# Patient Record
Sex: FEMALE | Race: BLACK OR AFRICAN AMERICAN
[De-identification: names, ages, dates, MRNs, and addresses within clinical notes are randomized per-mention and may not be internally consistent; named-entity substitution may affect disease eponyms.]

---

## 2017-03-31 ENCOUNTER — HOSPITAL ENCOUNTER (EMERGENCY)
Dept: HOSPITAL 53 - M ED | Age: 27
Discharge: HOME | End: 2017-03-31
Payer: OTHER GOVERNMENT

## 2017-03-31 VITALS
SYSTOLIC BLOOD PRESSURE: 134 MMHG | HEIGHT: 64 IN | BODY MASS INDEX: 25.78 KG/M2 | DIASTOLIC BLOOD PRESSURE: 65 MMHG | WEIGHT: 151 LBS

## 2017-03-31 DIAGNOSIS — J02.9: Primary | ICD-10-CM

## 2017-06-08 ENCOUNTER — HOSPITAL ENCOUNTER (EMERGENCY)
Dept: HOSPITAL 53 - M ED | Age: 27
Discharge: HOME | End: 2017-06-08
Payer: OTHER GOVERNMENT

## 2017-06-08 VITALS — DIASTOLIC BLOOD PRESSURE: 70 MMHG | SYSTOLIC BLOOD PRESSURE: 156 MMHG

## 2017-06-08 VITALS — WEIGHT: 143 LBS | BODY MASS INDEX: 24.41 KG/M2 | HEIGHT: 64 IN

## 2017-06-08 DIAGNOSIS — Z3A.01: ICD-10-CM

## 2017-06-08 DIAGNOSIS — O20.8: Primary | ICD-10-CM

## 2017-06-08 DIAGNOSIS — Z90.89: ICD-10-CM

## 2017-06-08 LAB
BASOPHILS # BLD AUTO: 0 K/MM3 (ref 0–0.2)
BASOPHILS NFR BLD AUTO: 0.3 % (ref 0–1)
EOSINOPHIL # BLD AUTO: 0.1 K/MM3 (ref 0–0.5)
EOSINOPHIL NFR BLD AUTO: 1.2 % (ref 0–3)
ERYTHROCYTE [DISTWIDTH] IN BLOOD BY AUTOMATED COUNT: 13.6 % (ref 11.5–14.5)
LARGE UNSTAINED CELL #: 0.1 K/MM3 (ref 0–0.4)
LARGE UNSTAINED CELL %: 3 % (ref 0–4)
LYMPHOCYTES # BLD AUTO: 2 K/MM3 (ref 1.5–6.5)
LYMPHOCYTES NFR BLD AUTO: 40.8 % (ref 24–44)
MCH RBC QN AUTO: 25.7 PG (ref 27–33)
MCHC RBC AUTO-ENTMCNC: 31.3 G/DL (ref 32–36.5)
MCV RBC AUTO: 82.3 FL (ref 80–96)
MONOCYTES # BLD AUTO: 0.3 K/MM3 (ref 0–0.8)
MONOCYTES NFR BLD AUTO: 5.9 % (ref 0–5)
NEUTROPHILS # BLD AUTO: 2.3 K/MM3 (ref 1.8–7.7)
NEUTROPHILS NFR BLD AUTO: 48.8 % (ref 36–66)
PLATELET # BLD AUTO: 210 K/MM3 (ref 150–450)
WBC # BLD AUTO: 4.7 K/MM3 (ref 4–10)

## 2017-07-24 ENCOUNTER — HOSPITAL ENCOUNTER (EMERGENCY)
Dept: HOSPITAL 53 - M ED | Age: 27
Discharge: HOME | End: 2017-07-24
Payer: OTHER GOVERNMENT

## 2017-07-24 VITALS — SYSTOLIC BLOOD PRESSURE: 123 MMHG | DIASTOLIC BLOOD PRESSURE: 54 MMHG

## 2017-07-24 VITALS — WEIGHT: 152.12 LBS | HEIGHT: 64 IN | BODY MASS INDEX: 25.97 KG/M2

## 2017-07-24 DIAGNOSIS — R11.2: ICD-10-CM

## 2017-07-24 DIAGNOSIS — Z3A.11: ICD-10-CM

## 2017-07-24 DIAGNOSIS — R19.7: ICD-10-CM

## 2017-07-24 DIAGNOSIS — O99.89: Primary | ICD-10-CM

## 2017-07-24 DIAGNOSIS — Z79.899: ICD-10-CM

## 2017-07-24 DIAGNOSIS — R10.84: ICD-10-CM

## 2017-07-24 DIAGNOSIS — E86.0: ICD-10-CM

## 2017-07-24 LAB
ALBUMIN SERPL BCG-MCNC: 3.2 GM/DL (ref 3.2–5.2)
ALBUMIN/GLOB SERPL: 0.76 {RATIO} (ref 1–1.93)
ALP SERPL-CCNC: 136 U/L (ref 45–117)
ALT SERPL W P-5'-P-CCNC: 24 U/L (ref 12–78)
AMYLASE SERPL-CCNC: 65 U/L (ref 25–115)
ANION GAP SERPL CALC-SCNC: 9 MEQ/L (ref 8–16)
AST SERPL-CCNC: 11 U/L (ref 15–37)
B-HCG SERPL-ACNC: (no result) MIU/ML
BASOPHILS # BLD AUTO: 0 K/MM3 (ref 0–0.2)
BASOPHILS NFR BLD AUTO: 0.1 % (ref 0–1)
BILIRUB CONJ SERPL-MCNC: < 0.1 MG/DL (ref 0–0.2)
BILIRUB SERPL-MCNC: 0.3 MG/DL (ref 0.2–1)
BUN SERPL-MCNC: 7 MG/DL (ref 7–18)
CALCIUM SERPL-MCNC: 9.4 MG/DL (ref 8.5–10.1)
CHLORIDE SERPL-SCNC: 101 MEQ/L (ref 98–107)
CO2 SERPL-SCNC: 24 MEQ/L (ref 21–32)
CREAT SERPL-MCNC: 0.28 MG/DL (ref 0.55–1.02)
EOSINOPHIL # BLD AUTO: 0 K/MM3 (ref 0–0.5)
EOSINOPHIL NFR BLD AUTO: 0.3 % (ref 0–3)
ERYTHROCYTE [DISTWIDTH] IN BLOOD BY AUTOMATED COUNT: 13.6 % (ref 11.5–14.5)
GFR SERPL CREATININE-BSD FRML MDRD: > 60 ML/MIN/{1.73_M2} (ref 60–?)
GLUCOSE SERPL-MCNC: 95 MG/DL (ref 70–105)
LARGE UNSTAINED CELL #: 0.1 K/MM3 (ref 0–0.4)
LARGE UNSTAINED CELL %: 1.9 % (ref 0–4)
LYMPHOCYTES # BLD AUTO: 1.5 K/MM3 (ref 1.5–6.5)
LYMPHOCYTES NFR BLD AUTO: 24.3 % (ref 24–44)
MCH RBC QN AUTO: 26.1 PG (ref 27–33)
MCHC RBC AUTO-ENTMCNC: 32.1 G/DL (ref 32–36.5)
MCV RBC AUTO: 81.3 FL (ref 80–96)
MONOCYTES # BLD AUTO: 0.3 K/MM3 (ref 0–0.8)
MONOCYTES NFR BLD AUTO: 5.3 % (ref 0–5)
NEUTROPHILS # BLD AUTO: 3.9 K/MM3 (ref 1.8–7.7)
NEUTROPHILS NFR BLD AUTO: 68 % (ref 36–66)
PLATELET # BLD AUTO: 171 K/MM3 (ref 150–450)
POTASSIUM SERPL-SCNC: 3.5 MEQ/L (ref 3.5–5.1)
PROT SERPL-MCNC: 7.4 GM/DL (ref 6.4–8.2)
SODIUM SERPL-SCNC: 134 MEQ/L (ref 136–145)
WBC # BLD AUTO: 5.8 K/MM3 (ref 4–10)

## 2017-07-24 PROCEDURE — 80076 HEPATIC FUNCTION PANEL: CPT

## 2017-07-24 PROCEDURE — 84702 CHORIONIC GONADOTROPIN TEST: CPT

## 2017-07-24 PROCEDURE — 96375 TX/PRO/DX INJ NEW DRUG ADDON: CPT

## 2017-07-24 PROCEDURE — 83690 ASSAY OF LIPASE: CPT

## 2017-07-24 PROCEDURE — 96361 HYDRATE IV INFUSION ADD-ON: CPT

## 2017-07-24 PROCEDURE — 96374 THER/PROPH/DIAG INJ IV PUSH: CPT

## 2017-07-24 PROCEDURE — 81001 URINALYSIS AUTO W/SCOPE: CPT

## 2017-07-24 PROCEDURE — 99283 EMERGENCY DEPT VISIT LOW MDM: CPT

## 2017-07-24 PROCEDURE — 82150 ASSAY OF AMYLASE: CPT

## 2017-07-24 PROCEDURE — 87086 URINE CULTURE/COLONY COUNT: CPT

## 2017-07-24 PROCEDURE — 85025 COMPLETE CBC W/AUTO DIFF WBC: CPT

## 2017-07-24 PROCEDURE — 80048 BASIC METABOLIC PNL TOTAL CA: CPT

## 2017-07-24 PROCEDURE — 76801 OB US < 14 WKS SINGLE FETUS: CPT

## 2017-07-24 NOTE — REP
First trimester obstetrical ultrasound, emergency room request for pelvic pain:

 

There are no comparisons.

 

The study is performed with transabdominal and Doppler imaging:

 

The bladder is poorly distended.

 

There is an intrauterine gestational sac with a fetal pole.

 

The fetal heart rate is 169 beats per minute.

 

The fetal pole crown-rump length is 4.3 cm correspond to 11 weeks 1 day

gestational age.  The LAURA is 02/11/2016. There is a yolk sac that is borderline

enlarged measuring 5.1 mm.

 

There is no subchorionic hematoma.

 

The maternal adnexa and cul-de-sac are unremarkable.  The Doppler resistive index

of the intraparenchymal arteries in the right ovary is 0.59 indicating there is

vascular flow in the right ovary.

 

There is no ultrasound assessment of the left ovary for reasons known to this

examiner.

 

 

Signed by

Kendrick Nguyen MD 07/24/2017 09:14 A

## 2017-08-19 ENCOUNTER — HOSPITAL ENCOUNTER (EMERGENCY)
Dept: HOSPITAL 53 - M ED | Age: 27
Discharge: HOME | End: 2017-08-19
Payer: OTHER GOVERNMENT

## 2017-08-19 VITALS — WEIGHT: 143.3 LBS | HEIGHT: 64 IN | BODY MASS INDEX: 24.46 KG/M2

## 2017-08-19 VITALS — SYSTOLIC BLOOD PRESSURE: 135 MMHG | DIASTOLIC BLOOD PRESSURE: 77 MMHG

## 2017-08-19 DIAGNOSIS — Z3A.15: ICD-10-CM

## 2017-08-19 DIAGNOSIS — Z79.899: ICD-10-CM

## 2017-08-19 DIAGNOSIS — O23.92: Primary | ICD-10-CM

## 2017-08-19 LAB
ERYTHROCYTE [DISTWIDTH] IN BLOOD BY AUTOMATED COUNT: 13.6 % (ref 11.5–14.5)
MCH RBC QN AUTO: 26.4 PG (ref 27–33)
MCHC RBC AUTO-ENTMCNC: 32.2 G/DL (ref 32–36.5)
MCV RBC AUTO: 82.2 FL (ref 80–96)
PLATELET # BLD AUTO: 174 K/MM3 (ref 150–450)
WBC # BLD AUTO: 7.9 K/MM3 (ref 4–10)

## 2017-08-19 NOTE — REPUSA
CLINICAL HISTORY:  Abdominal pain. 

 

TECHNIQUE:  Realtime sonographic images were obtained in multiple projections.

 

COMMENTS:

There is a single intrauterine gestation, variable position presentation. 

 

The biparietal diameter measures 3.1 cm.  This corresponds to a gestational age of 15 weeks 5 days.  
The abdominal circumference and femur length measure  8.7 cm and 1.8 cm, respectively, and are relati
vely proportionate to the BPD.  The HC-AC ratio is normal.  The composite fetal age is 15 weeks and 2
 days.

 

Estimated fetal weight based on BPD and AC is 116 grams.

 

Fetal heart motion was observed.  The heart rate is 160 beats per minute.

 

The placenta is left lateral free of the cervical os.

 

The amniotic fluid volume is normal.

 

The cervical length is 3.7 cm.

 

IMPRESSION:

 

1.         Single, live, intrauterine gestation with a composite gestational age of 15 weeks and 2 da
ys. 

2.         Estimated date of delivery is 2/8/2018

 

     Electronically signed by JAMISON MATT MD on 08/19/2017 08:38:27 PM ET

## 2017-08-21 ENCOUNTER — HOSPITAL ENCOUNTER (EMERGENCY)
Dept: HOSPITAL 53 - M ED | Age: 27
Discharge: LEFT BEFORE DISCHARGE | End: 2017-08-21
Payer: OTHER GOVERNMENT

## 2017-08-21 VITALS — DIASTOLIC BLOOD PRESSURE: 70 MMHG | SYSTOLIC BLOOD PRESSURE: 128 MMHG

## 2017-08-21 VITALS — HEIGHT: 64 IN | WEIGHT: 138.01 LBS | BODY MASS INDEX: 23.56 KG/M2

## 2017-08-21 DIAGNOSIS — Z53.29: ICD-10-CM

## 2017-08-21 DIAGNOSIS — O21.9: Primary | ICD-10-CM

## 2017-10-06 ENCOUNTER — HOSPITAL ENCOUNTER (EMERGENCY)
Dept: HOSPITAL 53 - M ED | Age: 27
Discharge: HOME | End: 2017-10-06
Payer: OTHER GOVERNMENT

## 2017-10-06 VITALS — WEIGHT: 141.29 LBS | HEIGHT: 64 IN | BODY MASS INDEX: 24.12 KG/M2

## 2017-10-06 VITALS — DIASTOLIC BLOOD PRESSURE: 74 MMHG | SYSTOLIC BLOOD PRESSURE: 139 MMHG

## 2017-10-06 DIAGNOSIS — R07.89: ICD-10-CM

## 2017-10-06 DIAGNOSIS — Z3A.00: ICD-10-CM

## 2017-10-06 DIAGNOSIS — Z79.899: ICD-10-CM

## 2017-10-06 DIAGNOSIS — O99.89: Primary | ICD-10-CM

## 2017-10-06 DIAGNOSIS — Z87.891: ICD-10-CM

## 2017-10-06 DIAGNOSIS — O26.892: ICD-10-CM

## 2017-10-06 LAB
ANION GAP SERPL CALC-SCNC: 8 MEQ/L (ref 8–16)
BASOPHILS # BLD AUTO: 0 10^3/UL (ref 0–0.2)
BASOPHILS NFR BLD AUTO: 0.1 % (ref 0–1)
BUN SERPL-MCNC: 5 MG/DL (ref 7–18)
CALCIUM SERPL-MCNC: 8.8 MG/DL (ref 8.5–10.1)
CHLORIDE SERPL-SCNC: 106 MEQ/L (ref 98–107)
CO2 SERPL-SCNC: 24 MEQ/L (ref 21–32)
CREAT SERPL-MCNC: 0.22 MG/DL (ref 0.55–1.02)
EOSINOPHIL # BLD AUTO: 0.1 10^3/UL (ref 0–0.5)
EOSINOPHIL NFR BLD AUTO: 0.5 % (ref 0–3)
ERYTHROCYTE [DISTWIDTH] IN BLOOD BY AUTOMATED COUNT: 14.3 % (ref 11.5–14.5)
GFR SERPL CREATININE-BSD FRML MDRD: > 60 ML/MIN/{1.73_M2} (ref 60–?)
GLUCOSE SERPL-MCNC: 78 MG/DL (ref 70–105)
IMM GRANULOCYTES NFR BLD: 0.4 % (ref 0–0)
INR PPP: 0.93
LYMPHOCYTES # BLD AUTO: 2.4 10^3/UL (ref 1.5–6.5)
LYMPHOCYTES NFR BLD AUTO: 20.6 % (ref 24–44)
MCH RBC QN AUTO: 26.4 PG (ref 27–33)
MCHC RBC AUTO-ENTMCNC: 32.1 G/DL (ref 32–36.5)
MCV RBC AUTO: 82.3 FL (ref 80–96)
MONOCYTES # BLD AUTO: 0.7 10^3/UL (ref 0–0.8)
MONOCYTES NFR BLD AUTO: 6.2 % (ref 0–5)
NEUTROPHILS # BLD AUTO: 8.5 10^3/UL (ref 1.8–7.7)
NEUTROPHILS NFR BLD AUTO: 72.2 % (ref 36–66)
NRBC BLD AUTO-RTO: 0 % (ref 0–0)
PLATELET # BLD AUTO: 225 10^3/UL (ref 150–450)
POTASSIUM SERPL-SCNC: 3.9 MEQ/L (ref 3.5–5.1)
SODIUM SERPL-SCNC: 138 MEQ/L (ref 136–145)
WBC # BLD AUTO: 11.7 10^3/UL (ref 4–10)

## 2017-10-06 PROCEDURE — 93970 EXTREMITY STUDY: CPT

## 2017-10-06 PROCEDURE — 93005 ELECTROCARDIOGRAM TRACING: CPT

## 2017-10-06 PROCEDURE — 99284 EMERGENCY DEPT VISIT MOD MDM: CPT

## 2017-10-06 PROCEDURE — 36415 COLL VENOUS BLD VENIPUNCTURE: CPT

## 2017-10-06 PROCEDURE — 85610 PROTHROMBIN TIME: CPT

## 2017-10-06 PROCEDURE — 85730 THROMBOPLASTIN TIME PARTIAL: CPT

## 2017-10-06 PROCEDURE — 96361 HYDRATE IV INFUSION ADD-ON: CPT

## 2017-10-06 PROCEDURE — 80048 BASIC METABOLIC PNL TOTAL CA: CPT

## 2017-10-06 PROCEDURE — 96360 HYDRATION IV INFUSION INIT: CPT

## 2017-10-06 PROCEDURE — 85025 COMPLETE CBC W/AUTO DIFF WBC: CPT

## 2017-10-06 PROCEDURE — 71010: CPT

## 2017-10-06 PROCEDURE — 71275 CT ANGIOGRAPHY CHEST: CPT

## 2017-10-06 NOTE — REP
Portable chest:  Single view.

 

History:  Central and right chest pain.  Pregnant patient.  Triple abdominal

healing.

 

Comparison study:  No comparison study.

 

Findings:  The lungs are well inflated and clear.  Pleural angles are sharp.

Heart is not enlarged.  Pulmonary vasculature is not increased.  No bony

abnormality is seen.

 

Impression:

 

Negative portable chest.

 

 

Signed by

Alfonso Moya MD 10/06/2017 04:02 P

## 2017-10-06 NOTE — REPUSA
CT angiogram of the chest

Clinical statement: Chest pain and shortness of breath.

Technique: Multiple axial CT images were obtained from the thoracic inlet through the upper abdomen a
fter a bolus administration of nonionic intravenous contrast. Coronal and sagittal reconstructions we
re also obtained.

No comparison is available.

Findings: The pulmonary arteries are well-opacified with contrast, with no intraluminal filling defec
ts to suggest embolism. The thoracic aorta is unremarkable. Thyroid gland is within normal limits. Th
ere is no thoracic lymphadenopathy. There are no pericardial or pleural effusions. The lungs are nazia
r. Limited imaging of the upper abdomen is unremarkable. There are no suspicious osseous lesions.

Impression: Unremarkable CT examination of the chest. No evidence of pulmonary embolism.

     Electronically signed by SHAVON HO MD on 10/06/2017 06:46:06 PM ET

## 2017-10-06 NOTE — REP
Duplex extremity venous ultrasound: Bilateral lower extremities.

 

History:  90 weeks gestation.  Shortness of breath.  Question DVT.

 

Findings:  The deep veins are anechoic and fully compressible from the groin to

the popliteal fossa in the right and left lower extremity.  Color flow imaging is

homogeneous.  Spectral Doppler interrogation demonstrates intact respiratory

variation in flow and normal manual augmentation of flow.  There is no evidence

of deep vein thrombosis.

 

Impression:

 

Negative bilateral lower extremity duplex venous ultrasound.  No evidence of deep

vein thrombosis.

 

 

Signed by

Alfonso Moya MD 10/06/2017 04:46 P

## 2017-10-07 NOTE — ECGEPIP
Stationary ECG Study

                           [facility] - ED

                                       

                                       Test Date:    2017-10-06

Pat Name:     NAYANA GRANADOS           Department:   

Patient ID:   [id number]                 Room:         -

Gender:       F                        Technician:   richie

:          1990               Requested By: ADONAY HAZEL PA-C.

Order Number: [id number]     Reading MD:   Freddy River

                                 Measurements

Intervals                              Axis          

Rate:         76                       P:            -27

ID:           [id number]                      QRS:          -20

QRSD:         97                       T:            136

QT:           382                                    

QTc:          430                                    

                           Interpretive Statements

SINUS RHYTHM

LOW VOLTAGE 

NO PRIORS

 

Electronically Signed On 10-7-2017 5:43:21 EDT by Freddy River

## 2017-10-09 NOTE — ECGEPIP
Stationary ECG Study

                              [facility]

                                       

                                       Test Date:    2017-10-06

Pat Name:     NAYANA GRANADOS           Department:   

Patient ID:   [id number]                 Room:         -

Gender:       F                        Technician:   richie

:          1990               Requested By: Freddy MCGHEE

Order Number: [id number]     Reading MD:   Ирина Pepe

                                 Measurements

Intervals                              Axis          

Rate:         68                       P:            20

ID:           [id number]                      QRS:          33

QRSD:         98                       T:            37

QT:           403                                    

QTc:          431                                    

                           Interpretive Statements

SINUS RHYTHM

IMPROVED VOLTAGE LIMB LEADS C/W  10/6/17

Electronically Signed On 10-9-2017 7:43:59 EDT by Ирина Pepe

## 2018-01-02 ENCOUNTER — HOSPITAL ENCOUNTER (OUTPATIENT)
Dept: HOSPITAL 53 - M LDO | Age: 28
Discharge: HOME | End: 2018-01-02
Attending: OBSTETRICS & GYNECOLOGY
Payer: COMMERCIAL

## 2018-01-02 DIAGNOSIS — Z79.899: ICD-10-CM

## 2018-01-02 DIAGNOSIS — O36.8190: Primary | ICD-10-CM

## 2018-01-02 DIAGNOSIS — Z3A.34: ICD-10-CM

## 2018-01-02 PROCEDURE — 59025 FETAL NON-STRESS TEST: CPT

## 2018-01-27 ENCOUNTER — HOSPITAL ENCOUNTER (OUTPATIENT)
Dept: HOSPITAL 53 - M LDO | Age: 28
Discharge: HOME | End: 2018-01-27
Attending: STUDENT IN AN ORGANIZED HEALTH CARE EDUCATION/TRAINING PROGRAM
Payer: COMMERCIAL

## 2018-01-27 DIAGNOSIS — O47.03: Primary | ICD-10-CM

## 2018-01-27 DIAGNOSIS — Z3A.37: ICD-10-CM

## 2018-01-27 PROCEDURE — 76815 OB US LIMITED FETUS(S): CPT

## 2018-01-27 RX ADMIN — ACETAMINOPHEN 1 MG: 325 TABLET ORAL at 22:00

## 2018-01-28 ENCOUNTER — HOSPITAL ENCOUNTER (INPATIENT)
Dept: HOSPITAL 53 - M LDI | Age: 28
LOS: 1 days | Discharge: HOME | DRG: 776 | End: 2018-01-29
Attending: STUDENT IN AN ORGANIZED HEALTH CARE EDUCATION/TRAINING PROGRAM | Admitting: STUDENT IN AN ORGANIZED HEALTH CARE EDUCATION/TRAINING PROGRAM
Payer: COMMERCIAL

## 2018-01-28 LAB
AMPHETAMINES UR QL SCN: NEGATIVE
BARBITURATES UR QL SCN: NEGATIVE
BENZODIAZ UR QL SCN: NEGATIVE
BZE UR QL SCN: NEGATIVE
CANNABINOIDS UR QL SCN: NEGATIVE
METHADONE URINE REFLEX: NEGATIVE
OPIATES UR QL SCN: NEGATIVE
PCP UR QL SCN: NEGATIVE

## 2018-01-28 RX ADMIN — ACETAMINOPHEN 1 MG: 500 TABLET ORAL at 14:23

## 2018-01-28 RX ADMIN — Medication 1 MLS/HR: at 08:15

## 2018-01-28 RX ADMIN — IBUPROFEN 1 MG: 800 TABLET, FILM COATED ORAL at 17:07

## 2018-01-28 RX ADMIN — ACETAMINOPHEN 1 MG: 500 TABLET ORAL at 21:24

## 2018-01-28 RX ADMIN — Medication 1 TAB: at 09:00

## 2018-01-28 RX ADMIN — MORPHINE SULFATE 1 MG: 2 INJECTION, SOLUTION INTRAMUSCULAR; INTRAVENOUS at 22:44

## 2018-01-28 RX ADMIN — KETOROLAC TROMETHAMINE 1 MG: 30 INJECTION, SOLUTION INTRAMUSCULAR at 08:49

## 2018-01-28 RX ADMIN — MORPHINE SULFATE 1 MG: 2 INJECTION, SOLUTION INTRAMUSCULAR; INTRAVENOUS at 08:14

## 2018-01-29 LAB — SYPHILIS: NONREACTIVE

## 2018-01-29 RX ADMIN — Medication 1 TAB: at 09:52

## 2018-08-08 ENCOUNTER — HOSPITAL ENCOUNTER (OUTPATIENT)
Dept: HOSPITAL 53 - M SFHCLERA | Age: 28
End: 2018-08-08
Attending: NURSE PRACTITIONER
Payer: COMMERCIAL

## 2018-08-08 DIAGNOSIS — J02.9: Primary | ICD-10-CM

## 2018-08-13 ENCOUNTER — HOSPITAL ENCOUNTER (OUTPATIENT)
Dept: HOSPITAL 53 - M RAD | Age: 28
End: 2018-08-13
Attending: PHYSICIAN ASSISTANT
Payer: COMMERCIAL

## 2018-08-13 DIAGNOSIS — E04.9: ICD-10-CM

## 2018-08-13 DIAGNOSIS — E05.90: Primary | ICD-10-CM

## 2018-08-13 PROCEDURE — 76536 US EXAM OF HEAD AND NECK: CPT
